# Patient Record
Sex: FEMALE | Race: OTHER | HISPANIC OR LATINO | URBAN - METROPOLITAN AREA
[De-identification: names, ages, dates, MRNs, and addresses within clinical notes are randomized per-mention and may not be internally consistent; named-entity substitution may affect disease eponyms.]

---

## 2022-09-06 ENCOUNTER — EMERGENCY (EMERGENCY)
Facility: HOSPITAL | Age: 27
LOS: 1 days | Discharge: HOME | End: 2022-09-06
Attending: EMERGENCY MEDICINE
Payer: MEDICAID

## 2022-09-06 VITALS
HEART RATE: 72 BPM | TEMPERATURE: 98 F | RESPIRATION RATE: 18 BRPM | WEIGHT: 138.89 LBS | OXYGEN SATURATION: 97 % | HEIGHT: 59 IN | SYSTOLIC BLOOD PRESSURE: 120 MMHG | DIASTOLIC BLOOD PRESSURE: 77 MMHG

## 2022-09-06 LAB — BLD GP AB SCN SERPL QL: SIGNIFICANT CHANGE UP

## 2022-09-06 PROCEDURE — 99284 EMERGENCY DEPT VISIT MOD MDM: CPT

## 2022-09-06 PROCEDURE — 86900 BLOOD TYPING SEROLOGIC ABO: CPT

## 2022-09-06 PROCEDURE — 86901 BLOOD TYPING SEROLOGIC RH(D): CPT

## 2022-09-06 PROCEDURE — 36415 COLL VENOUS BLD VENIPUNCTURE: CPT

## 2022-09-06 PROCEDURE — 86850 RBC ANTIBODY SCREEN: CPT

## 2022-09-06 PROCEDURE — 99283 EMERGENCY DEPT VISIT LOW MDM: CPT

## 2022-09-06 RX ORDER — ACETAMINOPHEN 500 MG
975 TABLET ORAL ONCE
Refills: 0 | Status: COMPLETED | OUTPATIENT
Start: 2022-09-06 | End: 2022-09-06

## 2022-09-06 RX ADMIN — Medication 975 MILLIGRAM(S): at 18:16

## 2022-09-06 NOTE — ED PROVIDER NOTE - NSFOLLOWUPINSTRUCTIONS_ED_ALL_ED_FT
Follow up with the primary care doctor within 1 week.  **If you experience any new or worsening symptoms or if you are concerned you can always come back to the emergency for a re-evaluation.  Tylenol 1000 mg orally every 6 hours as needed for pain/fever (max 4000 mg in 24 hours)

## 2022-09-06 NOTE — ED PROVIDER NOTE - PATIENT PORTAL LINK FT
You can access the FollowMyHealth Patient Portal offered by [facility] by registering at the following website: [email or web address]. By joining [facility]’s FollowMyHealth portal, you will also be able to view your health information using other applications (apps) compatible with our system.

## 2022-09-06 NOTE — ED ADULT TRIAGE NOTE - CHIEF COMPLAINT QUOTE
3 weeks pregnant fell down 4-5 stairs c/o left sided pain and headache wants to make sure everything ok with baby

## 2022-09-06 NOTE — ED ADULT NURSE NOTE - OBJECTIVE STATEMENT
pt is a 28 y/o female with c/o  fall. pt is  3 weeks  pregnant   and fell down the stairs   c/o left sided pain  and headache denies any  bleeding  nor  water  is  leaking. pt  ambulatory.

## 2022-09-06 NOTE — ED PROVIDER NOTE - CLINICAL SUMMARY MEDICAL DECISION MAKING FREE TEXT BOX
27 year old female who is 4 weeks and 5 days pregnant by LMP presents to the ED s/p fall. Patient denies any abdominal pain prior to fall. Therefore, low suspicion of ectopic pregnancy. Due to the absence of symptoms, currently the pain is likely due to injury of the hip. Explained to patient that given this is an early pregnancy, ultrasound will not show anything. Will get Type and Screen to confirm Rh factor which patient states is positive. Will give Tylenol for pain. Patient has no focal deficits. No indications for CT or x-ray at this time.

## 2022-09-06 NOTE — ED PROVIDER NOTE - PROGRESS NOTE DETAILS
Rh+. Will dc home and return instructions discussed. Pt is well appearing walking with steady gait, stable for discharge and follow up without fail with medical doctor. I had a detailed discussion with the patient and/or guardian regarding the historical points, exam findings, and any diagnostic results supporting the discharge diagnosis. Pt educated on care and need for follow up. Strict return instructions and red flag signs and symptoms discussed with patient. Questions answered. Pt shows understanding of discharge information and agrees to follow.

## 2022-09-06 NOTE — ED PROVIDER NOTE - ATTENDING APP SHARED VISIT CONTRIBUTION OF CARE
pregnant 4 weeks fell and injured left hip  no loc  able to weight bear  slight tenderness over left hip no indication for imaging  agree with acps assessment

## 2022-09-06 NOTE — ED ADULT NURSE NOTE - CHPI ED NUR TIMING2
6/29/2018      [facility]- declined on-boarding.             Outreach ,  Luis E Trent      
none

## 2022-09-06 NOTE — ED PROVIDER NOTE - OBJECTIVE STATEMENT
27 year old female with no significant PHMx and is  (LMP: 23) presents to the ED for evaluation s/p fall earlier today while going down the stairs. Patient states she slipped and fell onto the left side and accidently hit her head with the phone, but her head did not strike the ground. Denies any LOC. Reports she was ambulatory after the fall. Since the injury, relates having left hip pain radiating to the left lower abdomen. Denies vaginal bleeding or spotting, or any other complaints. Does endorse having headaches.   NKDA.

## 2025-04-26 NOTE — ED ADULT NURSE NOTE - NS ED NURSE DC INFO COMPLEXITY
Patient's first and last name, , procedure, and correct site confirmed prior to the start of procedure. Patient's first and last name, , procedure, and correct site confirmed prior to the start of procedure. Simple: Patient demonstrates quick and easy understanding